# Patient Record
Sex: FEMALE | Race: WHITE | NOT HISPANIC OR LATINO | URBAN - METROPOLITAN AREA
[De-identification: names, ages, dates, MRNs, and addresses within clinical notes are randomized per-mention and may not be internally consistent; named-entity substitution may affect disease eponyms.]

---

## 2018-09-22 ENCOUNTER — EMERGENCY (EMERGENCY)
Facility: HOSPITAL | Age: 83
LOS: 1 days | Discharge: HOME | End: 2018-09-22
Attending: EMERGENCY MEDICINE
Payer: COMMERCIAL

## 2018-09-22 VITALS
DIASTOLIC BLOOD PRESSURE: 74 MMHG | SYSTOLIC BLOOD PRESSURE: 169 MMHG | WEIGHT: 115.96 LBS | HEIGHT: 59 IN | OXYGEN SATURATION: 98 % | HEART RATE: 111 BPM | RESPIRATION RATE: 20 BRPM | TEMPERATURE: 98 F

## 2018-09-22 PROCEDURE — 29125 APPL SHORT ARM SPLINT STATIC: CPT | Mod: LT

## 2018-09-22 PROCEDURE — 99284 EMERGENCY DEPT VISIT MOD MDM: CPT | Mod: 25

## 2018-09-22 RX ORDER — TRAMADOL HYDROCHLORIDE 50 MG/1
25 TABLET ORAL ONCE
Qty: 0 | Refills: 0 | Status: DISCONTINUED | OUTPATIENT
Start: 2018-09-22 | End: 2018-09-22

## 2018-09-22 NOTE — ED ADULT NURSE NOTE - OBJECTIVE STATEMENT
patient states that she fell patient states that she fell injuring right wrist bruising and swelling noted able to move fingers + pulses denies hitting head or LOC, has amall abrasion to left cheek

## 2018-09-22 NOTE — ED PROVIDER NOTE - PRINCIPAL DIAGNOSIS
Closed fracture of left wrist, initial encounter Closed fracture of distal end of radius, unspecified fracture morphology, unspecified laterality, initial encounter

## 2018-09-22 NOTE — ED ADULT NURSE NOTE - NSIMPLEMENTINTERV_GEN_ALL_ED
Implemented All Fall Risk Interventions:  [place] to call system. Call bell, personal items and telephone within reach. Instruct patient to call for assistance. Room bathroom lighting operational. Non-slip footwear when patient is off stretcher. Physically safe environment: no spills, clutter or unnecessary equipment. Stretcher in lowest position, wheels locked, appropriate side rails in place. Provide visual cue, wrist band, yellow gown, etc. Monitor gait and stability. Monitor for mental status changes and reorient to person, place, and time. Review medications for side effects contributing to fall risk. Reinforce activity limits and safety measures with patient and family.

## 2018-09-22 NOTE — ED PROVIDER NOTE - MEDICAL DECISION MAKING DETAILS
Pt neuro intact, neurovascular intact, well apeparing, pain controlled. Splinted and stable for ortho f/u

## 2018-09-22 NOTE — ED PROVIDER NOTE - OBJECTIVE STATEMENT
85 y/o F presents to the ED c/o L wrist pain and swelling s/p fall that occurred today. Pt states she tripped and tried to break her fall by extending her arms forward, slight head trauma. Denies LOC, headache, dizziness, fever, chills, abd pain, n/v/d.

## 2018-09-22 NOTE — ED ADULT TRIAGE NOTE - CHIEF COMPLAINT QUOTE
patient states that she fell and landed on her left wrist to protect her head - small laceration or abrasion to left chin area

## 2018-09-22 NOTE — ED PROVIDER NOTE - CARE PLAN
Principal Discharge DX:	Closed fracture of left wrist, initial encounter Principal Discharge DX:	Closed fracture of distal end of radius, unspecified fracture morphology, unspecified laterality, initial encounter

## 2018-09-22 NOTE — ED PROVIDER NOTE - MUSCULOSKELETAL, MLM
Spine appears normal, deformity and ecchymosis to L wrist, sensory intact, good cap refill, ROM of fingers

## 2018-09-23 PROCEDURE — 70486 CT MAXILLOFACIAL W/O DYE: CPT

## 2018-09-23 PROCEDURE — 73110 X-RAY EXAM OF WRIST: CPT

## 2018-09-23 PROCEDURE — 99284 EMERGENCY DEPT VISIT MOD MDM: CPT | Mod: 25

## 2018-09-23 PROCEDURE — 72125 CT NECK SPINE W/O DYE: CPT

## 2018-09-23 PROCEDURE — 73110 X-RAY EXAM OF WRIST: CPT | Mod: 26,LT

## 2018-09-23 PROCEDURE — 73130 X-RAY EXAM OF HAND: CPT

## 2018-09-23 PROCEDURE — 70486 CT MAXILLOFACIAL W/O DYE: CPT | Mod: 26

## 2018-09-23 PROCEDURE — 70450 CT HEAD/BRAIN W/O DYE: CPT | Mod: 26

## 2018-09-23 PROCEDURE — 72125 CT NECK SPINE W/O DYE: CPT | Mod: 26

## 2018-09-23 PROCEDURE — 73130 X-RAY EXAM OF HAND: CPT | Mod: 26,LT

## 2018-09-23 PROCEDURE — 73090 X-RAY EXAM OF FOREARM: CPT | Mod: 26,LT

## 2018-09-23 PROCEDURE — 29125 APPL SHORT ARM SPLINT STATIC: CPT | Mod: LT

## 2018-09-23 PROCEDURE — 73090 X-RAY EXAM OF FOREARM: CPT

## 2018-09-23 PROCEDURE — 70450 CT HEAD/BRAIN W/O DYE: CPT

## 2018-09-23 RX ORDER — TRAMADOL HYDROCHLORIDE 50 MG/1
1 TABLET ORAL
Qty: 9 | Refills: 0 | OUTPATIENT
Start: 2018-09-23 | End: 2018-09-25

## 2018-09-23 RX ADMIN — TRAMADOL HYDROCHLORIDE 25 MILLIGRAM(S): 50 TABLET ORAL at 00:38

## 2018-09-24 ENCOUNTER — EMERGENCY (EMERGENCY)
Facility: HOSPITAL | Age: 83
LOS: 1 days | Discharge: HOME | End: 2018-09-24
Attending: EMERGENCY MEDICINE
Payer: COMMERCIAL

## 2018-09-24 VITALS — HEIGHT: 59 IN | WEIGHT: 119.93 LBS

## 2018-09-24 VITALS
HEART RATE: 98 BPM | SYSTOLIC BLOOD PRESSURE: 117 MMHG | OXYGEN SATURATION: 96 % | DIASTOLIC BLOOD PRESSURE: 56 MMHG | RESPIRATION RATE: 18 BRPM

## 2018-09-24 PROCEDURE — 29125 APPL SHORT ARM SPLINT STATIC: CPT | Mod: LT

## 2018-09-24 PROCEDURE — 73110 X-RAY EXAM OF WRIST: CPT | Mod: 26,LT

## 2018-09-24 PROCEDURE — 90471 IMMUNIZATION ADMIN: CPT

## 2018-09-24 PROCEDURE — 90715 TDAP VACCINE 7 YRS/> IM: CPT

## 2018-09-24 PROCEDURE — 99284 EMERGENCY DEPT VISIT MOD MDM: CPT | Mod: 25

## 2018-09-24 PROCEDURE — 96374 THER/PROPH/DIAG INJ IV PUSH: CPT | Mod: XU

## 2018-09-24 PROCEDURE — 73110 X-RAY EXAM OF WRIST: CPT

## 2018-09-24 RX ORDER — MORPHINE SULFATE 50 MG/1
4 CAPSULE, EXTENDED RELEASE ORAL ONCE
Qty: 0 | Refills: 0 | Status: DISCONTINUED | OUTPATIENT
Start: 2018-09-24 | End: 2018-09-24

## 2018-09-24 RX ORDER — TETANUS TOXOID, REDUCED DIPHTHERIA TOXOID AND ACELLULAR PERTUSSIS VACCINE, ADSORBED 5; 2.5; 8; 8; 2.5 [IU]/.5ML; [IU]/.5ML; UG/.5ML; UG/.5ML; UG/.5ML
0.5 SUSPENSION INTRAMUSCULAR ONCE
Qty: 0 | Refills: 0 | Status: COMPLETED | OUTPATIENT
Start: 2018-09-24 | End: 2018-09-24

## 2018-09-24 RX ADMIN — MORPHINE SULFATE 2 MILLIGRAM(S): 50 CAPSULE, EXTENDED RELEASE ORAL at 09:45

## 2018-09-24 RX ADMIN — MORPHINE SULFATE 4 MILLIGRAM(S): 50 CAPSULE, EXTENDED RELEASE ORAL at 10:39

## 2018-09-24 RX ADMIN — TETANUS TOXOID, REDUCED DIPHTHERIA TOXOID AND ACELLULAR PERTUSSIS VACCINE, ADSORBED 0.5 MILLILITER(S): 5; 2.5; 8; 8; 2.5 SUSPENSION INTRAMUSCULAR at 09:46

## 2018-09-24 NOTE — ED ADULT NURSE REASSESSMENT NOTE - NS ED NURSE REASSESS COMMENT FT1
per  'she took an oxycodone today along with the pain med you guys gave her"   'can we give her alittle pain medicine and see how she does'  PA aware  2mg MSo4 administered

## 2018-09-24 NOTE — ED ADULT NURSE NOTE - OBJECTIVE STATEMENT
was seen here saturday night cast applied to LUE 'its digging into my finger and it really hurts"  left index finger,  tips warm/mobile

## 2018-09-24 NOTE — ED ADULT TRIAGE NOTE - CHIEF COMPLAINT QUOTE
lt arm cast placed Sunday here. wants cast checked since tight near fingers, good neurological exam and sensation.

## 2018-09-24 NOTE — ED PROCEDURE NOTE - CPROC ED POST PROC CARE GUIDE1
Instructed patient/caregiver regarding signs and symptoms of infection./Keep the cast/splint/dressing clean and dry./Elevate the injured extremity as instructed./Instructed patient/caregiver to follow-up with primary care physician./Verbal/written post procedure instructions were given to patient/caregiver.
Verbal/written post procedure instructions were given to patient/caregiver./Instructed patient/caregiver to follow-up with primary care physician.

## 2018-09-24 NOTE — ED STATDOCS - MUSCULOSKELETAL FINDINGS, MLM
+left wrist, +ecchymosis, +motor and sensory sensation intact, cap refill < 2sec, radial pulse intact, skin intact warm and dry.

## 2018-09-24 NOTE — ED STATDOCS - OBJECTIVE STATEMENT
This is a 86 year old female with c/o L wrist FX.  She notes was seen in ED 9/22 where she was diagnosed with FX of wrist.  She admits to falling on wood piling at home on 9/21.  She sustained scratch to lower chin.  She was placed in splint.  She returned to ED due to worsening pain taking Tramadol at home, and some irritation of splinting material to hand.  She denies any falls since injury on 9/22.  She is unsure when last tetanus was.

## 2018-09-24 NOTE — ED STATDOCS - ATTENDING CONTRIBUTION TO CARE
seen with acp  here for follow up of fracture radius increasing pain and swelling  splint originally applied  ortho consulted  x-rays obtained   ortho advised outpatient follow up   agree with acps assessment hx and physical

## 2018-09-24 NOTE — ED STATDOCS - PROGRESS NOTE DETAILS
ortho contacted to see if patient needs reduction spoke to gibran RYAN from ortho no indication for reduction, advised to re-splint in sugar tong

## 2019-11-15 NOTE — ED ADULT NURSE NOTE - SOCIAL CONCERNS
Nutrition Care Plan      Patient seen for length of stay.  Current diet is moderate consistent carbohydrate, 45-75g/meal.  Appetite is good and intakes have been 100% of meal trays.  Weight history reviewed.  Patient follows a regular diet at home.  His blood glucose in the morning is typically around 220 and in the afternoon it is in the 100's.  Patient without diet related concerns at this time.  Will follow plan of care and monitor need for further nutrition intervention.   None

## 2020-11-17 NOTE — ED ADULT NURSE NOTE - NSIMPLEMENTINTERV_GEN_ALL_ED
18
Implemented All Fall Risk Interventions:  [place] to call system. Call bell, personal items and telephone within reach. Instruct patient to call for assistance. Room bathroom lighting operational. Non-slip footwear when patient is off stretcher. Physically safe environment: no spills, clutter or unnecessary equipment. Stretcher in lowest position, wheels locked, appropriate side rails in place. Provide visual cue, wrist band, yellow gown, etc. Monitor gait and stability. Monitor for mental status changes and reorient to person, place, and time. Review medications for side effects contributing to fall risk. Reinforce activity limits and safety measures with patient and family.

## 2023-08-11 NOTE — ED ADULT NURSE NOTE - NSSISCREENINGQ4_ED_A_ED
From: Sammie Sosa  To: Rolly MONI Corby  Sent: 8/11/2023 10:03 AM CDT  Subject: Diltiazem reactions    Rahat Feliberto,  I would like to stop Diltiazem for awhile. I am having skin problems that I believe is connected to the RX. I have red spots and purple spots on my arms and some on legs……they are pin head size and dime size. There are quite a lot of them. I have had them for weeks but thought they were bruises from working in the yard. Now I am realizing they are not bruises due to the different sizes. Reading further about side effects from the RX, I believe they are caused by the medication. The other less alarming symptoms are excessive sweating and tiredness.   Yaneli Sosa   No

## 2023-08-29 NOTE — ED PROVIDER NOTE - TEMPLATE, MLM
Called [facility] again, spoke with pharmacist who did an override due to dose increase on Lyrica from 100 mg nightly to 200 mg nightly. Insurance is giving them rejection stating soonest it can be filled is 9/03 even with pharmacy override. Pharmacist attempted to do override twice more and still gets rejection. There is nothing else they can do to override unfortunately per pharmacy.  Pharmacist recommends pt can call his insurance and explain it is dose increase and see if they can override. Pt informed.       
Called [facility] pharmacy. Currently closed for lunch and will re-open at 2pm. I was able to leave detailed message on prescriber secure voicemail for pharmacy regarding verbal okay to fill Lyrica early. If we do not hear back after 2pm from pharmacy, will follow up call.   
Can we call pharmacy? - usually dose adjustments they will fill early. We can give verbal okay to fill. If still will not fill or its an insurance thing, he can take Gabapentin 600 mg - equal dose to 200 mg of Lyrica is 2 - 600 mg tabs of Gabapentin. Thanks!  
Please advise below- pt not able to fill Lyrica again until 9/03. Dose increased from 100 mg nightly to 200 mg nightly. Note from today's visit states pt has been out for the past 5 days.       PDMP    
Orthopedic

## 2024-03-14 NOTE — ED PROCEDURE NOTE - CPROC ED ANATOMIC LOCATION1
Patient requesting 60 day supply of fludrocortisone due to travel, order pended to provider. Sasha Aceves LPN    
wrist
left